# Patient Record
Sex: MALE | URBAN - METROPOLITAN AREA
[De-identification: names, ages, dates, MRNs, and addresses within clinical notes are randomized per-mention and may not be internally consistent; named-entity substitution may affect disease eponyms.]

---

## 2023-01-26 ENCOUNTER — TELEPHONE ENCOUNTER (OUTPATIENT)
Dept: URBAN - METROPOLITAN AREA CLINIC 12 | Facility: CLINIC | Age: 33
End: 2023-01-26

## 2023-01-26 ENCOUNTER — OFFICE VISIT (OUTPATIENT)
Dept: URBAN - METROPOLITAN AREA CLINIC 12 | Facility: CLINIC | Age: 33
End: 2023-01-26
Payer: COMMERCIAL

## 2023-01-26 ENCOUNTER — DASHBOARD ENCOUNTERS (OUTPATIENT)
Age: 33
End: 2023-01-26

## 2023-01-26 ENCOUNTER — WEB ENCOUNTER (OUTPATIENT)
Dept: URBAN - METROPOLITAN AREA CLINIC 12 | Facility: CLINIC | Age: 33
End: 2023-01-26

## 2023-01-26 VITALS
WEIGHT: 285 LBS | DIASTOLIC BLOOD PRESSURE: 79 MMHG | BODY MASS INDEX: 44.73 KG/M2 | SYSTOLIC BLOOD PRESSURE: 116 MMHG | TEMPERATURE: 97.5 F | HEIGHT: 67 IN | HEART RATE: 74 BPM

## 2023-01-26 DIAGNOSIS — R19.7 DIARRHEA, UNSPECIFIED TYPE: ICD-10-CM

## 2023-01-26 PROCEDURE — 99203 OFFICE O/P NEW LOW 30 MIN: CPT | Performed by: INTERNAL MEDICINE

## 2023-01-26 NOTE — HPI-DIARRHEA
Patient states loose stools.  Patient states loose stools regularly for a period and now it is slowly improving back to normal.  Patient denies any specific food allergies.  Patient denies any recent travel or sick contacts.  Patient denies any other family members with any GI complaints.  Patient denies any new medications.  Patient denies any weight loss.  Patient denies any melena or rectal bleeding.